# Patient Record
Sex: MALE | Employment: OTHER | URBAN - METROPOLITAN AREA
[De-identification: names, ages, dates, MRNs, and addresses within clinical notes are randomized per-mention and may not be internally consistent; named-entity substitution may affect disease eponyms.]

---

## 2023-01-01 ENCOUNTER — MEDICAL CORRESPONDENCE (OUTPATIENT)
Dept: HEALTH INFORMATION MANAGEMENT | Facility: CLINIC | Age: 83
End: 2023-01-01
Payer: MEDICARE

## 2023-01-01 ENCOUNTER — TRANSCRIBE ORDERS (OUTPATIENT)
Dept: OTHER | Age: 83
End: 2023-01-01

## 2023-01-01 ENCOUNTER — TELEPHONE (OUTPATIENT)
Dept: SURGERY | Facility: CLINIC | Age: 83
End: 2023-01-01
Payer: MEDICARE

## 2023-01-01 DIAGNOSIS — K62.7 RADIATION PROCTITIS: Primary | ICD-10-CM

## 2023-01-01 DIAGNOSIS — K74.69 OTHER CIRRHOSIS OF LIVER (H): ICD-10-CM

## 2023-01-01 DIAGNOSIS — K62.5 RECTAL BLEEDING: ICD-10-CM

## 2023-05-18 NOTE — TELEPHONE ENCOUNTER
"LVM, no myc to schedule the following appointment:        New appt with Dr. Rose for \"bleeding/radiation proctitis\" per a referral from Dr. Wellington. Ok to offer appt on 5/30 at 8:30 am or 3 pm per Rosalba RN. Make sure both 15 min post op slots are open because the appointment is 30 mins, do not overbook. Left L pod #          "

## 2023-05-31 NOTE — TELEPHONE ENCOUNTER
"LVM, no myc, sent letter to schedule the following appointment:    New appt with Dr. Uriarte for \"bleeding/radiation proctitis\" per a referral from Dr. Wellington. Ok per Rosalba RODRIGUEZ to offer an appt on 6/14 at 6:30 pm in person, slot is held. Left K pod #  "

## 2023-05-31 NOTE — CONFIDENTIAL NOTE
DIAGNOSIS:  Other cirrhosis of liver    Appt Date: 07.31.2023   NOTES STATUS DETAILS   OFFICE NOTE from referring provider     OFFICE NOTES from other specialists Care Everywhere 05.15.2023 Melvin Wellington MD    DISCHARGE SUMMARY from hospital     MEDICATION LIST Care Everywhere    LIVER BIOSPY (IF APPLICABLE)      PATHOLOGY REPORTS      IMAGING     ENDOSCOPY (IF AVAILABLE)     COLONOSCOPY (IF AVAILABLE)     ULTRASOUND LIVER     CT OF ABDOMEN     MRI OF LIVER     FIBROSCAN, US ELASTOGRAPHY, FIBROSIS SCAN, MR ELASTOGRAPHY     LABS     HEPATIC PANEL (LIVER PANEL)     BASIC METABOLIC PANEL Care Everywhere 05.10.2023   COMPLETE METABOLIC PANEL Care Everywhere 05.26.2023   COMPLETE BLOOD COUNT (CBC) Care Everywhere 05.26.2023   INTERNATIONAL NORMALIZED RATIO (INR) Care Everywhere 04.23.2023    HEPATITIS C ANTIBODY Care Everywhere 04.03.2023   HEPATITIS C VIRAL LOAD/PCR     HEPATITIS C GENOTYPE     HEPATITIS B SURFACE ANTIGEN Care Everywhere 04.03.2023   HEPATITIS B SURFACE ANTIBODY Care Everywhere 04.03.2023   HEPATITIS B DNA QUANT LEVEL     HEPATITIS B CORE ANTIBODY

## 2023-07-31 ENCOUNTER — PRE VISIT (OUTPATIENT)
Dept: GASTROENTEROLOGY | Facility: CLINIC | Age: 83
End: 2023-07-31
Payer: MEDICARE